# Patient Record
Sex: FEMALE | Race: WHITE | Employment: UNEMPLOYED | URBAN - METROPOLITAN AREA
[De-identification: names, ages, dates, MRNs, and addresses within clinical notes are randomized per-mention and may not be internally consistent; named-entity substitution may affect disease eponyms.]

---

## 2018-09-06 ENCOUNTER — OFFICE VISIT (OUTPATIENT)
Dept: NEUROLOGY | Age: 43
End: 2018-09-06

## 2018-09-06 VITALS
BODY MASS INDEX: 18.22 KG/M2 | DIASTOLIC BLOOD PRESSURE: 60 MMHG | SYSTOLIC BLOOD PRESSURE: 110 MMHG | OXYGEN SATURATION: 99 % | HEIGHT: 62 IN | WEIGHT: 99 LBS | HEART RATE: 92 BPM

## 2018-09-06 DIAGNOSIS — R41.3 MEMORY DIFFICULTY: ICD-10-CM

## 2018-09-06 DIAGNOSIS — F07.81 POST CONCUSSION SYNDROME: Primary | ICD-10-CM

## 2018-09-06 RX ORDER — DEXTROAMPHETAMINE SACCHARATE, AMPHETAMINE ASPARTATE, DEXTROAMPHETAMINE SULFATE AND AMPHETAMINE SULFATE 1.25; 1.25; 1.25; 1.25 MG/1; MG/1; MG/1; MG/1
5 TABLET ORAL
COMMUNITY

## 2018-09-06 RX ORDER — LINACLOTIDE 290 UG/1
CAPSULE, GELATIN COATED ORAL
Refills: 0 | COMMUNITY
Start: 2018-07-16

## 2018-09-06 RX ORDER — CLONAZEPAM 1 MG/1
TABLET ORAL
Refills: 2 | COMMUNITY
Start: 2018-08-13

## 2018-09-06 RX ORDER — VENLAFAXINE HYDROCHLORIDE 225 MG/1
TABLET, EXTENDED RELEASE ORAL
Refills: 0 | COMMUNITY
Start: 2018-06-22
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
## 2018-09-06 NOTE — PROGRESS NOTES
Referred by therapist Omkar Dent, Phd.  [address] [facility], [address]  Phone: [phone]

## 2018-09-10 ENCOUNTER — DOCUMENTATION ONLY (OUTPATIENT)
Dept: NEUROLOGY | Age: 43
End: 2018-09-10

## 2018-09-20 ENCOUNTER — HOSPITAL ENCOUNTER (OUTPATIENT)
Dept: MRI IMAGING | Age: 43
Discharge: HOME | End: 2018-09-20
Attending: PSYCHIATRY & NEUROLOGY
Payer: COMMERCIAL

## 2018-09-20 DIAGNOSIS — F07.81 POST CONCUSSION SYNDROME: ICD-10-CM

## 2018-09-20 PROCEDURE — 70551 MRI BRAIN STEM W/O DYE: CPT

## 2018-09-25 ENCOUNTER — TELEPHONE (OUTPATIENT)
Dept: NEUROLOGY | Age: 43
End: 2018-09-25

## 2018-09-25 NOTE — TELEPHONE ENCOUNTER
----- Message from Courtney Gross MD sent at 9/21/2018  2:30 PM EDT -----  Normal MRI Brain. No areas of structural abnormality.

## 2022-10-02 ENCOUNTER — HOSPITAL ENCOUNTER (EMERGENCY)
Age: 47
Discharge: ANOTHER HOSPITAL | End: 2022-10-02
Attending: EMERGENCY MEDICINE | Admitting: EMERGENCY MEDICINE
Payer: OTHER MISCELLANEOUS

## 2022-10-02 VITALS
TEMPERATURE: 98.2 F | BODY MASS INDEX: 19.69 KG/M2 | HEART RATE: 96 BPM | WEIGHT: 107 LBS | HEIGHT: 62 IN | OXYGEN SATURATION: 97 % | DIASTOLIC BLOOD PRESSURE: 82 MMHG | RESPIRATION RATE: 15 BRPM | SYSTOLIC BLOOD PRESSURE: 112 MMHG

## 2022-10-02 DIAGNOSIS — S01.151A: Primary | ICD-10-CM
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
## 2022-10-02 PROCEDURE — 74011250637 HC RX REV CODE- 250/637: Performed by: NURSE PRACTITIONER

## 2022-10-02 PROCEDURE — 74011000250 HC RX REV CODE- 250: Performed by: NURSE PRACTITIONER

## 2022-10-02 PROCEDURE — 99285 EMERGENCY DEPT VISIT HI MDM: CPT | Performed by: EMERGENCY MEDICINE

## 2022-10-02 PROCEDURE — 99285 EMERGENCY DEPT VISIT HI MDM: CPT

## 2022-10-02 RX ORDER — HYDROCODONE BITARTRATE AND ACETAMINOPHEN 5; 325 MG/1; MG/1
1 TABLET ORAL
Status: COMPLETED | OUTPATIENT
Start: 2022-10-02 | End: 2022-10-02

## 2022-10-02 RX ORDER — AMOXICILLIN AND CLAVULANATE POTASSIUM 875; 125 MG/1; MG/1
1 TABLET, FILM COATED ORAL 2 TIMES DAILY
Qty: 20 TABLET | Refills: 0 | Status: SHIPPED | OUTPATIENT
Start: 2022-10-02

## 2022-10-02 RX ORDER — AMOXICILLIN AND CLAVULANATE POTASSIUM 875; 125 MG/1; MG/1
1 TABLET, FILM COATED ORAL
Status: COMPLETED | OUTPATIENT
Start: 2022-10-02 | End: 2022-10-02

## 2022-10-02 RX ADMIN — BACITRACIN ZINC, NEOMYCIN, POLYMYXIN B 1 PACKET: 400; 3.5; 5 OINTMENT TOPICAL at 20:52

## 2022-10-02 RX ADMIN — HYDROCODONE BITARTRATE AND ACETAMINOPHEN 1 TABLET: 5; 325 TABLET ORAL at 20:02

## 2022-10-02 RX ADMIN — FLUORESCEIN SODIUM 1 STRIP: 0.6 STRIP OPHTHALMIC at 20:41

## 2022-10-02 RX ADMIN — AMOXICILLIN AND CLAVULANATE POTASSIUM 1 TABLET: 875; 125 TABLET, FILM COATED ORAL at 20:51

## 2022-10-02 NOTE — ED TRIAGE NOTES
Pt states that she was bit my a dog at work about 45 mins PTA. Lac on right eyelid. Bleeding controlled. Pt reports having tetanus 3 weeks ago. Unsure if dog was UTD on Rabies vaccine.

## 2022-10-02 NOTE — ED NOTES
Pt presents to ED ambulatory complaining of dog bite. Abrasion noted to bridge of nose. Right upper eyelid skin laceration, bleeding controlled at this time. Pt is alert and oriented x 4, RR even and unlabored, skin is warm and dry. Assessment completed and pt updated on plan of care. Call bell in reach. Emergency Department Nursing Plan of Care       The Nursing Plan of Care is developed from the Nursing assessment and Emergency Department Attending provider initial evaluation. The plan of care may be reviewed in the ED Provider note.     The Plan of Care was developed with the following considerations:   Patient / Family readiness to learn indicated by:verbalized understanding  Persons(s) to be included in education: patient  Barriers to Learning/Limitations:No    Signed

## 2022-10-03 NOTE — ED NOTES
[facility] in ED; care of patient ended at this time. [facility] ER updated on pt's departure.

## 2022-10-03 NOTE — ED NOTES
TRANSFER - OUT REPORT:    Verbal report given to Gregory Sierra RN(name) on Regulo Quick  being transferred to [facility] ED (unit) for routine progression of care       Report consisted of patients Situation, Background, Assessment and   Recommendations(SBAR). Information from the following report(s) SBAR, Kardex, ED Summary, [facility] and Recent Results was reviewed with the receiving nurse. Lines:       Opportunity for questions and clarification was provided.       Patient transported with:   JUDAH

## 2022-10-03 NOTE — ED PROVIDER NOTES
EMERGENCY DEPARTMENT HISTORY AND PHYSICAL EXAM          Date: 10/2/2022  Patient Name: Eric Brooks    History of Presenting Illness     Chief Complaint   Patient presents with    Dog Bite         History Provided By: Patient    Chief Complaint: dog bite  Duration:  onset just PTA  Timing:  Acute  Location: right eyelid  Quality: Aching  Severity: 10 out of 10  Modifying Factors: opening eye worsens pain  Associated Symptoms:  abrasion on nose      HPI: Eric Brooks is a 56 y.o. female with a PMH of No significant past medical history who presents with laceration to right  eyelid onset just PTA. States she exerted dog vaccination clinic and a dog hit her in the right eye with his paw. States she has a laceration on her right eyelid. Abrasion on her nose. States her tetanus is up-to-date. PCP: ALTON Oliveira    Current Outpatient Medications   Medication Sig Dispense Refill    clonazePAM (KLONOPIN) 0.5 mg tablet Take by mouth per taper:  1 tablet in AM and 1 tablet in PM for 2 days  1/2 tablet in AM and 1 tablet in PM for 2 days  1/2 tablet in AM and 1/2 tablet in PM for 2 days  1/2 tablet in PM for 2 days  Then stop 15 Tab 0    venlafaxine-SR (EFFEXOR-XR) 150 mg capsule Take 150 mg by mouth daily. venlafaxine 225 mg tr24 TAKE 1 TABLET BY MOUTH EVERY DAY  0    LINZESS 290 mcg cap capsule TAKE 1 CAPSULE BY MOUTH EVERY DAY  0    SAPHRIS, BLACK CHERRY, 5 mg subl subLINGual tablet DISSOLVE 1 TABLET UNDER THE TONGUE AT BEDTIME  0    clonazePAM (KLONOPIN) 1 mg tablet TAKE 1 TABLET BY MOUTH TWICE A DAY  2    dextroamphetamine-amphetamine (ADDERALL) 5 mg tablet Take 5 mg by mouth. dextroamphetamine-amphetamine (ADDERALL) 30 mg tablet Take 30 mg by mouth two (2) times a day. cyclobenzaprine (FLEXERIL) 10 mg tablet Take 1 Tab by mouth three (3) times daily as needed for Muscle Spasm(s). 45 Tab 3    pantoprazole (PROTONIX) 40 mg tablet Take 1 Tab by mouth Daily (before breakfast).  30 Tab 3    traZODone (DESYREL) 100 mg tablet Take 1 Tab by mouth nightly. 27 Tab 3       Past History     Past Medical History:  Past Medical History:   Diagnosis Date    Depression     Esophagitis        Past Surgical History:  Past Surgical History:   Procedure Laterality Date    HX CERVICAL FUSION      HX CERVICAL FUSION      Anterior 2013, C3-C6 and Posterior 2015 C3-C6       Family History:  Family History   Problem Relation Age of Onset    Cancer Father     Headache Maternal Grandmother        Social History:  Social History     Tobacco Use    Smoking status: Never    Smokeless tobacco: Never   Substance Use Topics    Alcohol use: No    Drug use: Never       Allergies: Allergies   Allergen Reactions    Other Medication Unknown (comments)     steroids    Sulfa (Sulfonamide Antibiotics) Hives         Review of Systems   Review of Systems   Constitutional:  Negative for fatigue and fever. Respiratory:  Negative for shortness of breath and wheezing. Cardiovascular:  Negative for chest pain. Gastrointestinal:  Negative for abdominal pain. Musculoskeletal:  Negative for arthralgias, myalgias, neck pain and neck stiffness. Skin:  Positive for wound. Negative for pallor and rash. Neurological:  Negative for dizziness, tremors and headaches. All other systems reviewed and are negative. Physical Exam     Vitals:    10/02/22 1919   BP: 118/84   Pulse: 86   Resp: 14   Temp: 98.4 °F (36.9 °C)   SpO2: 99%   Weight: 48.5 kg (107 lb)   Height: 5' 2\" (1.575 m)     Physical Exam  Vitals and nursing note reviewed. Constitutional:       General: She is not in acute distress. Appearance: Normal appearance. She is well-developed. HENT:      Head: Normocephalic and atraumatic. No raccoon eyes or right periorbital erythema.         Comments: No bony tenderness     Right Ear: External ear normal.      Left Ear: External ear normal.      Nose: Nose normal.      Mouth/Throat:      Mouth: Mucous membranes are moist.   Eyes: General: Vision grossly intact. No allergic shiner or visual field deficit. Right eye: No foreign body. Extraocular Movements: Extraocular movements intact. Conjunctiva/sclera: Conjunctivae normal.      Pupils: Pupils are equal, round, and reactive to light. Right eye: No fluorescein uptake. Comments: OS 23/30  OD 20/25   Cardiovascular:      Rate and Rhythm: Normal rate and regular rhythm. Heart sounds: Normal heart sounds. Pulmonary:      Effort: Pulmonary effort is normal. No respiratory distress. Breath sounds: Normal breath sounds. No wheezing. Abdominal:      General: Bowel sounds are normal.      Palpations: Abdomen is soft. Tenderness: There is no abdominal tenderness. Musculoskeletal:         General: Normal range of motion. Cervical back: Normal range of motion and neck supple. Lymphadenopathy:      Cervical: No cervical adenopathy. Skin:     General: Skin is warm and dry. Findings: No rash. Neurological:      Mental Status: She is alert and oriented to person, place, and time. Cranial Nerves: No cranial nerve deficit. Coordination: Coordination normal.   Psychiatric:         Behavior: Behavior normal.         Thought Content: Thought content normal.         Judgment: Judgment normal.             Medical Decision Making   I am the first provider for this patient. I reviewed the vital signs, available nursing notes, past medical history, past surgical history, family history and social history. Vital Signs-Reviewed the patient's vital signs. Records Reviewed: Nursing Notes    Provider Notes (Medical Decision Making):   DDX laceration abrasion fractured orbit  ED Course as of 10/02/22 2304   Sun Oct 02, 2022   2100 Patient has requested a plastic surgeon for eyelid repair. Ophthalmology plastics not available at [address] [place] [address] at this time. Discussed available findings with [facility] ED.   He advised plastic surgery on-call at that hospital and accepted patient to the ER. [AN]      ED Course User Index  [AN] Domenica Ferrari NP            Procedures:  Procedures    Diagnostic Study Results     Labs -   No results found for this or any previous visit (from the past 12 hour(s)). Radiologic Studies -   No orders to display     CT Results  (Last 48 hours)      None          CXR Results  (Last 48 hours)      None                  Current Discharge Medication List            Please note that this dictation was completed with Dragon, computer voice recognition software. Quite often unanticipated grammatical, syntax, homophones, and other interpretive errors are inadvertently transcribed by the computer software. Please disregard these errors. Additionally, please excuse any errors that have escaped final proofreading. Diagnosis     Clinical Impression:   1.  Dog bite of right eyelid, initial encounter

## 2023-02-17 ENCOUNTER — TELEPHONE (OUTPATIENT)
Dept: SURGERY | Age: 48
End: 2023-02-17

## 2023-02-17 NOTE — TELEPHONE ENCOUNTER
Maryanne Rey from [facility] called to see if he could get orders sent over for him to continue to do home health for the patient as she has requested,  please call [phone]

## 2023-02-22 NOTE — MR AVS SNAPSHOT
[address] [place] [address] [address] [facility] [place] [id number] [phone] Patient: Isi Figueroa MRN: [id number] Visit Information Date & Time Provider Department Dept. Phone Encounter #  
 9/6/2018  2:00 PM Antonio Gonzalez MD [facility] Neurology [facility] [phone] [id number] Follow-up Instructions Return if symptoms worsen or fail to improve. Upcoming Health Maintenance Date Due DTaP/Tdap/Td series (1 - Tdap) 5/20/1997 PAP AKA CERVICAL CYTOLOGY 5/20/1997 Influenza Age 5 to Adult 8/1/2018 MEDICARE YEARLY EXAM 8/16/2018 Allergies as of 9/6/2018  Review Complete On: 9/6/2018 By: Jessika De Jesus LPN Severity Noted Reaction Type Reactions Sulfa (Sulfonamide Antibiotics)  09/06/2018    Hives Current Immunizations  Never Reviewed No immunizations on file. Not reviewed this visit You Were Diagnosed With   
  
 Codes Comments Post concussion syndrome    -  Primary ICD-10-CM: F07.81 ICD-9-CM: 310.2 Memory difficulty     ICD-10-CM: R41.3 ICD-9-CM: 780.93 Vitals BP Pulse Height(growth percentile) Weight(growth percentile) SpO2 BMI  
 110/60 92 5' 2\" (1.575 m) 99 lb (44.9 kg) 99% 18.11 kg/m2 Smoking Status Never Smoker BMI and BSA Data Body Mass Index Body Surface Area  
 18.11 kg/m 2 1.4 m 2 Your Updated Medication List  
  
   
This list is accurate as of 9/6/18  2:50 PM.  Always use your most recent med list.  
  
  
  
  
 ADDERALL 5 mg tablet Generic drug:  dextroamphetamine-amphetamine Take 5 mg by mouth.  
  
 clonazePAM 1 mg tablet Commonly known as:  KlonoPIN  
TAKE 1 TABLET BY MOUTH TWICE A DAY  
  
 LINZESS 290 mcg Cap capsule Generic drug:  linaclotide TAKE 1 CAPSULE BY MOUTH EVERY DAY  
  
 SAPHRIS (BLACK CHERRY) 5 mg Subl subLINGual tablet Generic drug:  asenapine DISSOLVE 1 TABLET UNDER THE TONGUE AT BEDTIME  
  
 venlafaxine 225 mg Tr24 TAKE 1 TABLET BY MOUTH EVERY DAY We Performed the Following REFERRAL TO PSYCHOLOGY [RSL58 Custom] Comments:  
 Pt with chronic cognitive difficulty. Last seen by you in 2014. Please repeat cognitive testing and send to her Clinical Psychologist, Dr Jazmyne Coughlin as well as pt's Psychiatrist.  Thanks. VITAMIN B12 & FOLATE [19417 CPT(R)] Follow-up Instructions Return if symptoms worsen or fail to improve. To-Do List   
 09/06/2018 Imaging:  MRI BRAIN WO CONT Referral Information Referral ID Referred By Referred To  
  
 [id number] Sabiha Valladares, PHD   
   [facility] [address] Phone: [phone] Fax: [phone] Visits Status Start Date End Date 1 New Request 9/6/18 9/6/19 If your referral has a status of pending review or denied, additional information will be sent to support the outcome of this decision. Introducing [facility]! Ann Riley introduces [employer] patient portal. Now you can access parts of your medical record, email your doctor's office, and request medication refills online. 1. In your internet browser, go to [email or web address] 2. Click on the First Time User? Click Here link in the Sign In box. You will see the New Member Sign Up page. 3. Enter your [employer] Access Code exactly as it appears below. You will not need to use this code after youve completed the sign-up process. If you do not sign up before the expiration date, you must request a new code. · [employer] Access Code: [email or web address] Expires: 12/5/2018  1:31 PM 
 
4. Enter the last four digits of your Social Security Number (xxxx) and Date of Birth (mm/dd/yyyy) as indicated and click Submit. You will be taken to the next sign-up page. 5. Create a [employer] ID. This will be your [employer] login ID and cannot be changed, so think of one that is secure and easy to remember. 6. Create a [employer] password. You can change your password at any time. 7. Enter your Password Reset Question and Answer. This can be used at a later time if you forget your password. 8. Enter your e-mail address. You will receive e-mail notification when new information is available in [address] 9. Click Sign Up. You can now view and download portions of your medical record. 10. Click the Download Summary menu link to download a portable copy of your medical information. If you have questions, please visit the Frequently Asked Questions section of the [employer] website. Remember, [employer] is NOT to be used for urgent needs. For medical emergencies, dial 911. Now available from your iPhone and Android! Please provide this summary of care documentation to your next provider. Your primary care clinician is listed as Amisha Beckwith. If you have any questions after today's visit, please call [phone]. regular rate and rhythm/no murmur information could not be obtained